# Patient Record
Sex: FEMALE | Race: WHITE | NOT HISPANIC OR LATINO | URBAN - METROPOLITAN AREA
[De-identification: names, ages, dates, MRNs, and addresses within clinical notes are randomized per-mention and may not be internally consistent; named-entity substitution may affect disease eponyms.]

---

## 2022-07-09 ENCOUNTER — TELEPHONE ENCOUNTER (OUTPATIENT)
Dept: URBAN - METROPOLITAN AREA CLINIC 121 | Facility: CLINIC | Age: 27
End: 2022-07-09

## 2022-07-10 ENCOUNTER — TELEPHONE ENCOUNTER (OUTPATIENT)
Dept: URBAN - METROPOLITAN AREA CLINIC 121 | Facility: CLINIC | Age: 27
End: 2022-07-10

## 2022-07-10 RX ORDER — ALBUTEROL SULFATE 0.63 MG/3ML
SOLUTION RESPIRATORY (INHALATION) ONCE A DAY
Refills: 0 | Status: ACTIVE | COMMUNITY
Start: 2016-10-26

## 2024-08-08 ENCOUNTER — APPOINTMENT (RX ONLY)
Dept: URBAN - METROPOLITAN AREA CLINIC 116 | Facility: CLINIC | Age: 29
Setting detail: DERMATOLOGY
End: 2024-08-08

## 2024-08-08 DIAGNOSIS — D22 MELANOCYTIC NEVI: ICD-10-CM

## 2024-08-08 DIAGNOSIS — L60.3 NAIL DYSTROPHY: ICD-10-CM

## 2024-08-08 DIAGNOSIS — D18.0 HEMANGIOMA: ICD-10-CM

## 2024-08-08 DIAGNOSIS — B07.8 OTHER VIRAL WARTS: ICD-10-CM

## 2024-08-08 PROBLEM — D22.5 MELANOCYTIC NEVI OF TRUNK: Status: ACTIVE | Noted: 2024-08-08

## 2024-08-08 PROBLEM — D18.01 HEMANGIOMA OF SKIN AND SUBCUTANEOUS TISSUE: Status: ACTIVE | Noted: 2024-08-08

## 2024-08-08 PROCEDURE — ? COUNSELING

## 2024-08-08 PROCEDURE — 99202 OFFICE O/P NEW SF 15 MIN: CPT

## 2024-08-08 ASSESSMENT — LOCATION DETAILED DESCRIPTION DERM
LOCATION DETAILED: RIGHT LATERAL SUPERIOR CHEST
LOCATION DETAILED: LEFT THUMBNAIL
LOCATION DETAILED: RIGHT THUMB LUNULA
LOCATION DETAILED: LEFT PROXIMAL DORSAL FOREARM

## 2024-08-08 ASSESSMENT — LOCATION ZONE DERM
LOCATION ZONE: FINGERNAIL
LOCATION ZONE: TRUNK
LOCATION ZONE: ARM

## 2024-08-08 ASSESSMENT — LOCATION SIMPLE DESCRIPTION DERM
LOCATION SIMPLE: LEFT THUMBNAIL
LOCATION SIMPLE: CHEST
LOCATION SIMPLE: RIGHT THUMBNAIL
LOCATION SIMPLE: LEFT FOREARM

## 2024-11-19 ENCOUNTER — DASHBOARD ENCOUNTERS (OUTPATIENT)
Age: 29
End: 2024-11-19

## 2024-11-19 ENCOUNTER — LAB OUTSIDE AN ENCOUNTER (OUTPATIENT)
Dept: URBAN - METROPOLITAN AREA CLINIC 63 | Facility: CLINIC | Age: 29
End: 2024-11-19

## 2024-11-19 ENCOUNTER — OFFICE VISIT (OUTPATIENT)
Dept: URBAN - METROPOLITAN AREA CLINIC 63 | Facility: CLINIC | Age: 29
End: 2024-11-19
Payer: COMMERCIAL

## 2024-11-19 VITALS
OXYGEN SATURATION: 99 % | BODY MASS INDEX: 25.97 KG/M2 | DIASTOLIC BLOOD PRESSURE: 80 MMHG | TEMPERATURE: 96.6 F | HEIGHT: 66 IN | WEIGHT: 161.6 LBS | SYSTOLIC BLOOD PRESSURE: 89 MMHG | HEART RATE: 82 BPM

## 2024-11-19 DIAGNOSIS — L29.9 PRURITUS: ICD-10-CM

## 2024-11-19 DIAGNOSIS — R10.84 GENERALIZED ABDOMINAL PAIN: ICD-10-CM

## 2024-11-19 PROCEDURE — 99204 OFFICE O/P NEW MOD 45 MIN: CPT

## 2024-11-19 NOTE — HPI-TODAY'S VISIT:
Patient is a 28-year-old female who presents today with complaints of abdominal pain.  Patient states that 1 month ago she had 1 episode of upper abdominal pain that went away after 3 hours.  She states that she was eating a lot of food and taking meds for migraine at that time.  She states she has never had an episode like that since however she is now experiencing intermittent sharp pains in her abdomen.  She states that the location changes daily and she will really experience nausea symptoms with this.  She denies acid reflux symptoms, vomiting and issues with her bowel movement.  She is also complaining of itching "inside of her abdomen".  She has not noticed any rashes however sometimes she will have to scratch her abdomen and it feels like the itch is on the inside.  At 1 point patient did see Dr. Lee and had an EGD scheduled however it was canceled.  Patient states that she does not recall this.

## 2024-12-02 ENCOUNTER — TELEPHONE ENCOUNTER (OUTPATIENT)
Dept: URBAN - METROPOLITAN AREA CLINIC 63 | Facility: CLINIC | Age: 29
End: 2024-12-02

## 2024-12-03 ENCOUNTER — TELEPHONE ENCOUNTER (OUTPATIENT)
Dept: URBAN - METROPOLITAN AREA CLINIC 63 | Facility: CLINIC | Age: 29
End: 2024-12-03

## 2024-12-24 ENCOUNTER — OFFICE VISIT (OUTPATIENT)
Dept: URBAN - METROPOLITAN AREA CLINIC 63 | Facility: CLINIC | Age: 29
End: 2024-12-24

## 2025-01-21 ENCOUNTER — OFFICE VISIT (OUTPATIENT)
Dept: URBAN - METROPOLITAN AREA CLINIC 63 | Facility: CLINIC | Age: 30
End: 2025-01-21

## 2025-01-21 RX ORDER — RIZATRIPTAN BENZOATE 10 MG/1
TAKE 1 TABLET BY MOUTH AT ONSET OF MIGRAINE. MAX 1 DOSE DAILY TABLET ORAL
Qty: 9 EACH | Refills: 0 | Status: ACTIVE | COMMUNITY

## 2025-01-21 NOTE — HPI-TODAY'S VISIT:
LOV 11/19/2024 for abdominal pain and pruritus Review CT scan Labs not completed? Need EGD?   LOV Patient is a 28-year-old female who presents today with complaints of abdominal pain.  Patient states that 1 month ago she had 1 episode of upper abdominal pain that went away after 3 hours.  She states that she was eating a lot of food and taking meds for migraine at that time.  She states she has never had an episode like that since however she is now experiencing intermittent sharp pains in her abdomen.  She states that the location changes daily and she will really experience nausea symptoms with this.  She denies acid reflux symptoms, vomiting and issues with her bowel movement.  She is also complaining of itching "inside of her abdomen".  She has not noticed any rashes however sometimes she will have to scratch her abdomen and it feels like the itch is on the inside.  At 1 point patient did see Dr. Lee and had an EGD scheduled however it was canceled.  Patient states that she does not recall this.   . MRI 12/19/2024 - Posterior right hepatic lobe lesion likely represents hemangioma although difficult to completely characterized, recommend RUQ US to confirm finding - No definite suspicious hepatic lesion - Previously described mass in left gluteal region could represent nerve sheath tumor or vascular lesion such as aneurysm, recommend MRI pelvis soft tissue protocol with and without contrast for additional evaluation . CT abdomen/pelvis 11/27/2024 - No acute intra-abdominal or pelvic abnormality - Incidental liver lesions require further evaluation with gadolinium enhanced liver MRI - Homogenously enhancing mass in left gluteal region between gluteus medius and gluteus jarod muscle near sciatic notch - Slight bilateral inguinal lymph node prominence - Finding could be related to sarcoma, biopsy is necessary - PET/CT correlation is suggested - Liver lesions could represent metastatic disease . Labs 8/18/2024 - CBCD: Percent eosinophils 8.1, absolute eosinophils 0.6 otherwise unremarkable - PT/INR within normal limits - CMP: Potassium 3.4 otherwise unremarkable